# Patient Record
Sex: MALE | Race: ASIAN | NOT HISPANIC OR LATINO | URBAN - METROPOLITAN AREA
[De-identification: names, ages, dates, MRNs, and addresses within clinical notes are randomized per-mention and may not be internally consistent; named-entity substitution may affect disease eponyms.]

---

## 2017-03-14 ENCOUNTER — OFFICE VISIT - HEALTHEAST (OUTPATIENT)
Dept: FAMILY MEDICINE | Facility: CLINIC | Age: 22
End: 2017-03-14

## 2017-03-14 DIAGNOSIS — G44.209 TENSION HEADACHE: ICD-10-CM

## 2017-03-14 DIAGNOSIS — J06.9 URI (UPPER RESPIRATORY INFECTION): ICD-10-CM

## 2017-03-14 ASSESSMENT — MIFFLIN-ST. JEOR: SCORE: 1890.82

## 2021-04-24 ENCOUNTER — AMBULATORY - HEALTHEAST (OUTPATIENT)
Dept: NURSING | Facility: CLINIC | Age: 26
End: 2021-04-24

## 2021-05-15 ENCOUNTER — AMBULATORY - HEALTHEAST (OUTPATIENT)
Dept: NURSING | Facility: CLINIC | Age: 26
End: 2021-05-15

## 2021-05-30 VITALS — BODY MASS INDEX: 33.43 KG/M2 | WEIGHT: 213 LBS | HEIGHT: 67 IN

## 2021-06-09 NOTE — PROGRESS NOTES
Assessment/plan  1. URI (upper respiratory infection)  2. Tension headache  No red flag signs.   Discussed his headache appears to be more of a tension headache. We discussed possible treatment. He defers any prescription. Advised on supportive care.   No reactive airway disease. Discussed this is likely viral. Discussed reasons to be seen for follow up or urgently.   Note provided for absence at work for two days.   Encouraged to keep up with routine health maintenance.     Subjective  [age] year old male here his mom.   New patient.   Notes cough, his mom is worried his asthma is present. Patient notes asthma as an infant, before he was [age] years old. His mom says he had to use a nebulizer machine. This cough is present for about one week. The cough is dry. No fever. No chills. Some nasal congestion. The drainage is clear. Associated with a headache, towards the back of his head, around the side, is pressure like. No nausea or vomiting. No light or noise sensitivity. The headache resolved on its own. He is not really sure if he has migraines. He does not really have many headaches in the past. Has not used any medications.   He works in  in the school. He missed work for two days. He though he should not be at work. He needs a note for his absence.   He denies any significant medical, surgical, family history.   No medications.   No allergies.       Past Medical History:   Diagnosis Date     Asthma     childhood     There is no problem list on file for this patient.    History reviewed. No pertinent surgical history.  Family History   Problem Relation Age of Onset     Hyperlipidemia Mother      Hypertension Mother      No Medical Problems Father      No Medical Problems Sister      No Medical Problems Brother      Social History     Social History     Marital status: Single     Spouse name: N/A     Number of children: N/A     Years of education: N/A     Occupational History     Not on file.      Social History Main Topics     Smoking status: Never Smoker     Smokeless tobacco: Not on file     Alcohol use No     Drug use: No     Sexual activity: Not on file     Other Topics Concern     Not on file     Social History Narrative     No current outpatient prescriptions on file prior to visit.     No current facility-administered medications on file prior to visit.      Objective  Vitals:    03/14/17 1505   BP: 104/62   Pulse: 80   SpO2: 92%       General Appearance:  Alert, cooperative, no distress, appears stated age   Head:  Normocephalic, without obvious abnormality, atraumatic   Eyes:  PERRL, conjunctiva/corneas clear, EOM's intact   Ears:  Normal TM's and external ear canals, both ears   Nose: Nares normal, septum midline,mucosa normal, no drainage    Throat: Lips, mucosa, and tongue normal; teeth and gums normal   Neck: Supple, symmetrical, trachea midline, no adenopathy;  thyroid: not enlarged, symmetric, no tenderness/mass/nodules; no carotid bruit or JVD   Lungs:   Clear to auscultation bilaterally, respirations unlabored   Heart:  Regular rate and rhythm, S1 and S2 normal, no murmur, rub, or gallop   Abdomen:   Soft, non-tender, bowel sounds active all four quadrants,  no masses, no organomegaly   Extremities: Extremities normal, atraumatic, no cyanosis or edema   Skin: Skin color, texture, turgor normal, no rashes or lesions   Neurologic: Normal, CN 2-12 intact